# Patient Record
(demographics unavailable — no encounter records)

---

## 2024-11-11 NOTE — PHYSICAL EXAM
[Normocephalic] : normocephalic [Atraumatic] : atraumatic [EOMI] : extra ocular movement intact [Supple] : supple [No Supraclavicular Adenopathy] : no supraclavicular adenopathy [No Cervical Adenopathy] : no cervical adenopathy [Examined in the supine and seated position] : examined in the supine and seated position [No dominant masses] : no dominant masses in right breast  [No dominant masses] : no dominant masses left breast [No Nipple Retraction] : no left nipple retraction [No Nipple Discharge] : no left nipple discharge [Breast Mass Right Breast ___cm] : no masses [Breast Mass Left Breast ___cm] : no masses [No Axillary Lymphadenopathy] : no left axillary lymphadenopathy [No Edema] : no edema [No Rashes] : no rashes [No Ulceration] : no ulceration [Breast Nipple Inversion] : nipples not inverted [Breast Nipple Retraction] : nipples not retracted [Breast Nipple Flattening] : nipples not flattened [Breast Nipple Fissures] : nipples not fissured [Breast Abnormal Lactation (Galactorrhea)] : no galactorrhea [Breast Abnormal Secretion Bloody Fluid] : no bloody discharge [Breast Abnormal Secretion Serous Fluid] : no serous discharge [Breast Abnormal Secretion Opalescent Fluid] : no milky discharge [de-identified] : On exam, the patient has full B-cup breasts.  On palpation, I cannot feel any suspicious densities in either breast.  She has a well-healed right breast periareolar incision from her incision for atypia.  She has no axillary, supraclavicular, or cervical adenopathy.

## 2024-11-11 NOTE — REASON FOR VISIT
[Follow-Up: _____] : a [unfilled] follow-up visit [FreeTextEntry1] : The patient comes in with a family history of breast cancer and a history of a right breast retroareolar wide excision performed in June 2017 for some atypical duct hyperplasia.  She was offered tamoxifen for risk reduction but refused due to the risk/side effect profile.  She comes in for routine clinical follow-up and gets yearly mammography and ultrasound.

## 2024-11-11 NOTE — ASSESSMENT
[FreeTextEntry1] : The patient is a 45-year-old G1, P1 premenopausal  female of [place] descent. She underwent menarche at age 12 and had her first child at age 34. She has a family history of breast cancer with her maternal great aunt who had breast cancer at age 50 and a maternal second cousin had breast cancer at age 30. Her father passed away from lymphoma at age 58 and a maternal uncle had prostate cancer. Her maternal grandfather  from liver cancer at age 50 and a paternal uncle  from stomach cancer at age 60. The patient underwent a baseline mammography and ultrasound on 2019 showing a right breast retroareolar density measuring 9 mm on ultrasound. 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 She has a Amy calculated model lifetime risk of breast cancer of 25.6%. She underwent an MRI on 2017 showing no suspicious findings. She underwent a right breast retroareolar wide excision on 2017 just showing some residual focal atypical duct hyperplasia. She has been offered screening MRI but has a high deductible and we are just using MRIs as needed.  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  Her last bilateral mammography and ultrasound was reviewed from 2024 performed at [facility] showing the previously biopsied left breast 7:00 fibroadenoma which was unchanged in size. On exam today, I cannot feel any suspicious densities in either breast. She was reassured and should continue yearly follow-up again in 2025. Her next bilateral mammography and ultrasound will be due in 2025 and she was given prescriptions.

## 2024-11-11 NOTE — HISTORY OF PRESENT ILLNESS
[FreeTextEntry1] : The patient is a 46-year-old G1, P1 premenopausal  female of [place] descent.  She underwent menarche at age 12 and had her first child at age 34.  She has a family history of breast cancer with her maternal great aunt who had breast cancer at age 50 and a maternal second cousin had breast cancer at age 30.  Her father passed away from lymphoma at age 58 and a maternal uncle had prostate cancer.  Her maternal grandfather  from liver cancer at age 50 and a paternal uncle  from stomach cancer at age 60.  The patient underwent a baseline mammography and ultrasound on 2019 showing a right breast retroareolar density measuring 9 mm on ultrasound.  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  She has a Amy calculated model lifetime risk of breast cancer of 25.6%.  She underwent an MRI on 2017 showing no suspicious findings.  She underwent a right breast retroareolar wide excision on 2017 just showing some residual focal atypical duct hyperplasia.  She underwent a left breast 7:00 ultrasound-guided core biopsy in 2022 showing a benign fibroadenoma.  She comes in now for routine yearly follow-up and continues to get yearly mammography and ultrasound.

## 2024-12-02 NOTE — ADDENDUM
[FreeTextEntry1] : I spent greater than 75% of the consultation in face-to-face counseling and coordination of care in this patient with a family history of breast cancer and history of right breast atypical ductal hyperplasia who comes in for breast cancer screening/surveillance.

## 2024-12-02 NOTE — ASSESSMENT
[FreeTextEntry1] : The patient is a 46-year-old G1, P1 premenopausal  female of [place] descent. She underwent menarche at age 12 and had her first child at age 34. She has a family history of breast cancer with her maternal great aunt who had breast cancer at age 50 and a maternal second cousin had breast cancer at age 30. Her father passed away from lymphoma at age 58 and a maternal uncle had prostate cancer. Her maternal grandfather  from liver cancer at age 50 and a paternal uncle  from stomach cancer at age 60.  The patient's mother passed away from what sounds like ovarian cancer at age 78 but genetic testing was negative.  The patient underwent a baseline mammography and ultrasound on 2019 showing a right breast retroareolar density measuring 9 mm on ultrasound. 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 She has a Amy calculated model lifetime risk of breast cancer of 25.6%. She underwent an MRI on 2017 showing no suspicious findings. She underwent a right breast retroareolar wide excision on 2017 just showing some residual focal atypical duct hyperplasia. She has been offered screening MRI but has a high deductible and we are just using MRIs as needed.  She underwent a left breast 7:00 ultrasound-guided core biopsy performed at [facility] on 2022 for a benign fibroadenoma.  Her last bilateral mammography and ultrasound was reviewed from 2024 performed at [facility] showing the previously biopsied left breast 7:00 fibroadenoma which was unchanged in size. On exam today, I cannot feel any suspicious densities in either breast. She was reassured and should continue yearly follow-up again in 2025. Her next bilateral mammography and ultrasound will be due in 2025 and she was given prescriptions.  Of note, the patient tells me that her mother recently passed away from what sounds like ovarian cancer at age 78.  Her mother did undergo genetic testing and sounds like she may have had a VUS but was negative.

## 2024-12-02 NOTE — ASSESSMENT
[FreeTextEntry1] : The patient is a 46-year-old G1, P1 premenopausal  female of [place] descent. She underwent menarche at age 12 and had her first child at age 34. She has a family history of breast cancer with her maternal great aunt who had breast cancer at age 50 and a maternal second cousin had breast cancer at age 30. Her father passed away from lymphoma at age 58 and a maternal uncle had prostate cancer. Her maternal grandfather  from liver cancer at age 50 and a paternal uncle  from stomach cancer at age 60.  The patient's mother passed away from what sounds like ovarian cancer at age 78 but genetic testing was negative.  The patient underwent a baseline mammography and ultrasound on 2019 showing a right breast retroareolar density measuring 9 mm on ultrasound. 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 She has a Amy calculated model lifetime risk of breast cancer of 25.6%. She underwent an MRI on 2017 showing no suspicious findings. She underwent a right breast retroareolar wide excision on 2017 just showing some residual focal atypical duct hyperplasia. She has been offered screening MRI but has a high deductible and we are just using MRIs as needed.  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  Her last bilateral mammography and ultrasound was reviewed from 2024 performed at [facility] showing the previously biopsied left breast 7:00 fibroadenoma which was unchanged in size. On exam today, I cannot feel any suspicious densities in either breast. She was reassured and should continue yearly follow-up again in 2025. Her next bilateral mammography and ultrasound will be due in 2025 and she was given prescriptions.  Of note, the patient tells me that her mother recently passed away from what sounds like ovarian cancer at age 78.  Her mother did undergo genetic testing and sounds like she may have had a VUS but was negative.

## 2024-12-02 NOTE — PHYSICAL EXAM
[Normocephalic] : normocephalic [Atraumatic] : atraumatic [EOMI] : extra ocular movement intact [Supple] : supple [No Supraclavicular Adenopathy] : no supraclavicular adenopathy [No Cervical Adenopathy] : no cervical adenopathy [Examined in the supine and seated position] : examined in the supine and seated position [No dominant masses] : no dominant masses in right breast  [No dominant masses] : no dominant masses left breast [No Nipple Retraction] : no left nipple retraction [No Nipple Discharge] : no left nipple discharge [Breast Mass Right Breast ___cm] : no masses [Breast Mass Left Breast ___cm] : no masses [No Axillary Lymphadenopathy] : no left axillary lymphadenopathy [No Edema] : no edema [No Rashes] : no rashes [No Ulceration] : no ulceration [Breast Nipple Inversion] : nipples not inverted [Breast Nipple Retraction] : nipples not retracted [Breast Nipple Flattening] : nipples not flattened [Breast Nipple Fissures] : nipples not fissured [Breast Abnormal Lactation (Galactorrhea)] : no galactorrhea [Breast Abnormal Secretion Bloody Fluid] : no bloody discharge [Breast Abnormal Secretion Serous Fluid] : no serous discharge [Breast Abnormal Secretion Opalescent Fluid] : no milky discharge [de-identified] : On exam, the patient has full B-cup breasts.  On palpation, I cannot feel any suspicious densities in either breast.  She has a well-healed right breast periareolar incision from her incision for atypia.  She has no axillary, supraclavicular, or cervical adenopathy.

## 2024-12-02 NOTE — HISTORY OF PRESENT ILLNESS
[FreeTextEntry1] : The patient is a 46-year-old G1, P1 premenopausal  female of [place] descent.  She underwent menarche at age 12 and had her first child at age 34.  She has a family history of breast cancer with her maternal great aunt who had breast cancer at age 50 and a maternal second cousin had breast cancer at age 30.  Her father passed away from lymphoma at age 58 and a maternal uncle had prostate cancer.  Her maternal grandfather  from liver cancer at age 50 and a paternal uncle  from stomach cancer at age 60.  The patient's mother passed away from what sounds like ovarian cancer at age 78 but genetic testing was negative.   The patient underwent a baseline mammography and ultrasound on 2019 showing a right breast retroareolar density measuring 9 mm on ultrasound.  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  She has a Amy calculated model lifetime risk of breast cancer of 25.6%.  She underwent an MRI on 2017 showing no suspicious findings.  She underwent a right breast retroareolar wide excision on 2017 just showing some residual focal atypical duct hyperplasia.  She underwent a left breast 7:00 ultrasound-guided core biopsy in 2022 showing a benign fibroadenoma.  She comes in now for routine yearly follow-up and continues to get yearly mammography and ultrasound.

## 2024-12-02 NOTE — PHYSICAL EXAM
[Normocephalic] : normocephalic [Atraumatic] : atraumatic [EOMI] : extra ocular movement intact [Supple] : supple [No Supraclavicular Adenopathy] : no supraclavicular adenopathy [No Cervical Adenopathy] : no cervical adenopathy [Examined in the supine and seated position] : examined in the supine and seated position [No dominant masses] : no dominant masses in right breast  [No dominant masses] : no dominant masses left breast [No Nipple Retraction] : no left nipple retraction [No Nipple Discharge] : no left nipple discharge [Breast Mass Right Breast ___cm] : no masses [Breast Mass Left Breast ___cm] : no masses [No Axillary Lymphadenopathy] : no left axillary lymphadenopathy [No Edema] : no edema [No Rashes] : no rashes [No Ulceration] : no ulceration [Breast Nipple Inversion] : nipples not inverted [Breast Nipple Retraction] : nipples not retracted [Breast Nipple Flattening] : nipples not flattened [Breast Nipple Fissures] : nipples not fissured [Breast Abnormal Lactation (Galactorrhea)] : no galactorrhea [Breast Abnormal Secretion Bloody Fluid] : no bloody discharge [Breast Abnormal Secretion Serous Fluid] : no serous discharge [Breast Abnormal Secretion Opalescent Fluid] : no milky discharge [de-identified] : On exam, the patient has full B-cup breasts.  On palpation, I cannot feel any suspicious densities in either breast.  She has a well-healed right breast periareolar incision from her incision for atypia.  She has no axillary, supraclavicular, or cervical adenopathy.